# Patient Record
Sex: FEMALE | Race: WHITE
[De-identification: names, ages, dates, MRNs, and addresses within clinical notes are randomized per-mention and may not be internally consistent; named-entity substitution may affect disease eponyms.]

---

## 2021-03-18 PROBLEM — D64.9 ANEMIA: Status: ACTIVE | Noted: 2021-03-18

## 2021-03-18 PROBLEM — E28.2 PCOS (POLYCYSTIC OVARIAN SYNDROME): Status: ACTIVE | Noted: 2021-03-18

## 2021-03-18 PROBLEM — F31.9 BIPOLAR 1 DISORDER (HCC): Status: ACTIVE | Noted: 2021-03-18

## 2021-03-18 PROBLEM — Z72.0 TOBACCO USE: Status: ACTIVE | Noted: 2021-03-18

## 2021-05-05 PROBLEM — N84.0 ENDOMETRIAL POLYP: Status: ACTIVE | Noted: 2021-05-05

## 2022-05-12 ENCOUNTER — NURSE TRIAGE (OUTPATIENT)
Dept: OTHER | Facility: CLINIC | Age: 32
End: 2022-05-12

## 2022-05-12 NOTE — TELEPHONE ENCOUNTER
Subjective: Caller states \"neck pain\"     Current Symptoms:   Rt Neck pain - denies injury   Limited mobility   Tingling in fingers started today     Onset:  3-4 days after waking up     Associated Symptoms: na     Pain Severity: 7/10    Temperature:  None     What has been tried: position changes + ibuprofen + massage + heat    LMP: na Pregnant: no    Recommended disposition: See PCP within 24 Hours    Care advice provided, patient verbalizes understanding; denies any other questions or concerns; instructed to call back for any new or worsening symptoms. Agrees to f/u with hcp    This triage is a result of a call to [address] [place] [address]. Please do not respond to the triage nurse through this encounter. Any subsequent communication should be directly with the patient.       Reason for Disposition   Numbness in an arm or hand (i.e., loss of sensation)    Protocols used: NECK PAIN OR STIFFNESS-ADULT-

## 2023-01-23 ENCOUNTER — NURSE TRIAGE (OUTPATIENT)
Dept: OTHER | Facility: CLINIC | Age: 33
End: 2023-01-23

## 2023-01-24 NOTE — TELEPHONE ENCOUNTER
Location of patient: [place]    Subjective: Caller states tender, sore painful knot on back of neck, 2 now, 5-6 ays ago, was a natalie sized, now  2-3 inches across, 1 inch wide, oval sized. Dimes sized, minimal redness. Current Symptoms: painful swelling/lump on neck    Onset: 6 days ago; gradual    Associated Symptoms: NA    Pain Severity: 7/10; aching; constant    Temperature: 98.5 by unknown method    What has been tried: aleve    LMP: NA Pregnant: NA    Recommended disposition: See PCP within 24 Hours    Care advice provided, patient verbalizes understanding; denies any other questions or concerns; instructed to call back for any new or worsening symptoms. Patient/caller agrees to follow-up with PCP  or [facility] if PCP unavailable    This triage is a result of a call to [address] [place] [address]. Please do not respond to the triage nurse through this encounter. Any subsequent communication should be directly with the patient.       Reason for Disposition   [1] Swelling is painful to touch AND [2] no fever    Protocols used: Skin Lump or Localized Swelling-ADULT-

## 2023-01-26 ENCOUNTER — NURSE TRIAGE (OUTPATIENT)
Dept: OTHER | Age: 33
End: 2023-01-26

## 2023-01-26 NOTE — TELEPHONE ENCOUNTER
Location of patient: [place]    Subjective: Caller states \"About 9 days ago the back of my neck started swelling. No injury occurred. Just woke up and it was there. Used warm and cold compresses. Another knot popped up. Both of them have gotten bigger and started to spread. Went to urgent care within 24 hours. Gave me prednisone and scheduled me for ultrasound this Friday. My left hand has started to become numb and lasted 1 day. Intermittently is going numb and tingly. 45 minutes ago I started feeling sick. \"     Current Symptoms: knots developing on neck, nausea, left hand tingling/numbness, sharp pain in spine    Onset: 9 days ago     Associated Symptoms: NA    Pain Severity: 6/10; numbness, tingling; intermittent    Temperature: denies fever     What has been tried: prednisone, warm and cold compresses     LMP:  unknown  Pregnant: Unknown    Recommended disposition: Go to ED Now    Care advice provided, patient verbalizes understanding; denies any other questions or concerns; instructed to call back for any new or worsening symptoms. Patient/caller agrees to proceed to local Emergency Department    This triage is a result of a call to [address] [place] [address]. Please do not respond to the triage nurse through this encounter. Any subsequent communication should be directly with the patient.     Reason for Disposition   Weakness of an arm or hand    Protocols used: Neck Pain or Stiffness-ADULT-AH

## 2023-02-02 ENCOUNTER — NURSE TRIAGE (OUTPATIENT)
Dept: OTHER | Facility: CLINIC | Age: 33
End: 2023-02-02

## 2023-02-19 ENCOUNTER — NURSE TRIAGE (OUTPATIENT)
Dept: OTHER | Facility: CLINIC | Age: 33
End: 2023-02-19

## 2023-02-20 NOTE — TELEPHONE ENCOUNTER
Location of patient: [place]    Subjective: Caller states \" That she has two cyst on her neck. Since the 14th and her canceled surgery. She states that they are puprle in color. She states that her surgery is cancelled due to her blood sugars. She states that the is in severe pain. The change in color is new, with bruising. States that the cysts has grown and it is purple. Patient states that she has completed her course of antibiotics; and the cysts are located at the baseline of her neck. \"     Current Symptoms: pain     Onset: 3 days ago; worsening    Associated Symptoms:  swelling    Pain Severity: 8/10; sharp, burning; excruciating    Temperature: 99.1 by forehead thermometer    What has been tried: tylenol      Recommended disposition: Go to ED Now    Care advice provided, patient verbalizes understanding; denies any other questions or concerns; instructed to call back for any new or worsening symptoms. Patient/caller agrees to proceed to nearest Emergency Department    This triage is a result of a call to [address] [place] [address]. Please do not respond to the triage nurse through this encounter. Any subsequent communication should be directly with the patient.     Reason for Disposition   Black (necrotic) color or blisters develop in wound    Protocols used: Boil (Skin Abscess)-ADULT-

## 2023-02-26 ENCOUNTER — NURSE TRIAGE (OUTPATIENT)
Dept: OTHER | Facility: CLINIC | Age: 33
End: 2023-02-26

## 2023-02-26 NOTE — TELEPHONE ENCOUNTER
Location of patient: [place]    Subjective: Caller states \"cysts on my neck. I was scheduled for surgery and now I am diabetic as well. My sugar was high so they sent me home. She squeezed my neck to check it. My neck started to really swell now and it is purple. Fever a few days ago, I called you and you advised me to go to ED. I decided not to go. I called my surgeon to get some antibiotics. Now today, it is affecting opening my mouth. I am doing warm packs, ice packs. I have blisters on my neck now. Last night I jumped in a hot tub. I woke up today and my cyst has drained a lot. I am concerned, should I be worried about that? My cyst drained externally should I be worried. Patient is on antibiotics. I have heat blisters on the back of neck. \"     Current Symptoms: blisters and draining cyst on neck. Had a fever, pt is diabetic as well. Onset: 6 weeks ago; sudden    Associated Symptoms: NA    Pain Severity: 6/10; pressure; constant    Temperature: 97.9 orally    What has been tried: warm compress, cold compress. LMP:  IUD  Pregnant: No    Recommended disposition: Go to ED/[facility] Now (Or to Office with PCP Approval)    Care advice provided, patient verbalizes understanding; denies any other questions or concerns; instructed to call back for any new or worsening symptoms. Patient/caller agrees to proceed to [facility] Emergency Department    This triage is a result of a call to [address] [place] [address] of [employer]. Please do not respond to the triage nurse through this encounter. Any subsequent communication should be directly with the patient.           Reason for Disposition   Black (necrotic) color or blisters develop in wound    Protocols used: Boil (Skin Abscess)-ADULT-

## 2023-08-24 ENCOUNTER — NURSE TRIAGE (OUTPATIENT)
Dept: OTHER | Facility: CLINIC | Age: 33
End: 2023-08-24

## 2023-08-24 NOTE — TELEPHONE ENCOUNTER
Location of patient: [place]    Subjective: Caller states she took 14 extra units of Humalog ten minutes ago by accident. Blood sugar taken while on the phone and the reading is 423. Denies having any abnormal symptoms. Current Symptoms: OD of insulin    Onset: 10 minutes ago    Associated Symptoms: Patient denies symptoms    Pain Severity: Denies pain    Temperature: Denies fever     What has been tried: nothing    Recommended disposition:  Call PCP now. This writer connected to PCP answering service. Care advice provided, patient verbalizes understanding; denies any other questions or concerns; instructed to call back for any new or worsening symptoms. This triage is a result of a call to [address] [place] [address]. Please do not respond to the triage nurse through this encounter. Any subsequent communication should be directly with the patient.     Reason for Disposition   Diabetes drug error or overdose (e.g., took wrong type of insulin or took extra dose)    Protocols used: Medication Question Call-ADULT-

## 2023-09-18 ENCOUNTER — NURSE TRIAGE (OUTPATIENT)
Dept: OTHER | Facility: CLINIC | Age: 33
End: 2023-09-18

## 2023-09-18 NOTE — TELEPHONE ENCOUNTER
Location of patient: [place]    Subjective: Caller states \"abscess\"     Current Symptoms: History abscesses on neck treated and almost healed. Patient reporting vulvar abscess lager than golf ball size. Abscess draining white and black draining. Patient took 4 extra strength Tylenol 4.5 hours ago. Nausea for 3 days. Onset: 3 days ago;     Pain Severity: 7/10; Temperature: unknown, patient has not taken temperature    What has been tried: warm compress, Tylenol    LMP:  n/a IUD  Pregnant: No    Recommended disposition: See PCP within 24 Hours. Call [facility] Now    Care advice provided, patient verbalizes understanding; denies any other questions or concerns; instructed to call back for any new or worsening symptoms. Patient/caller agrees to follow-up with PCP  Patient provided contact for [facility] and agrees to call now. This triage is a result of a call to [address] [place] [address]. Please do not respond to the triage nurse through this encounter. Any subsequent communication should be directly with the patient.     Reason for Disposition   Painful lump or swelling at opening to vagina (on labia)   Genital area looks infected (e.g., draining sore, spreading redness)   Unexplained nausea   [1] DOUBLE DOSE (an extra dose or lesser amount) of over-the-counter (OTC) drug AND [2] any symptoms (e.g., dizziness, nausea, pain, sleepiness)    Protocols used: Boil (Skin Abscess)-ADULT-, Vulvar Symptoms-ADULT-, Medication Question Call-ADULT-, Nausea-ADULT-AH

## 2025-06-02 ENCOUNTER — OFFICE VISIT (OUTPATIENT)
Facility: CLINIC | Age: 35
End: 2025-06-02
Payer: COMMERCIAL

## 2025-06-02 VITALS
DIASTOLIC BLOOD PRESSURE: 87 MMHG | HEART RATE: 91 BPM | RESPIRATION RATE: 16 BRPM | BODY MASS INDEX: 52.52 KG/M2 | WEIGHT: 293 LBS | SYSTOLIC BLOOD PRESSURE: 110 MMHG | OXYGEN SATURATION: 96 % | TEMPERATURE: 99.2 F

## 2025-06-02 DIAGNOSIS — B34.9 VIRAL ILLNESS: Primary | ICD-10-CM

## 2025-06-02 LAB
POC RAPID INFLUENZA A: NEGATIVE
POC SARS-COV-2 AG BINAX: NORMAL

## 2025-06-02 RX ORDER — INSULIN LISPRO 100 [IU]/ML
11 INJECTION, SOLUTION INTRAVENOUS; SUBCUTANEOUS
COMMUNITY
Start: 2025-01-16

## 2025-06-02 RX ORDER — TRIAMCINOLONE ACETONIDE 1 MG/G
OINTMENT TOPICAL
COMMUNITY
Start: 2025-04-03

## 2025-06-02 RX ORDER — INSULIN GLARGINE 100 [IU]/ML
36 INJECTION, SOLUTION SUBCUTANEOUS NIGHTLY
COMMUNITY
Start: 2025-01-16

## 2025-06-02 RX ORDER — TACROLIMUS 1 MG/G
OINTMENT TOPICAL 2 TIMES DAILY
COMMUNITY
Start: 2025-04-03 | End: 2026-04-03

## 2025-06-02 RX ORDER — DICYCLOMINE HYDROCHLORIDE 20 MG/1
1 TABLET ORAL
COMMUNITY
Start: 2025-03-21

## 2025-06-02 RX ORDER — ALPRAZOLAM 0.5 MG/1
0.5 TABLET, ORALLY DISINTEGRATING ORAL
COMMUNITY
Start: 2025-06-02

## 2025-06-02 RX ORDER — FLUOXETINE HYDROCHLORIDE 40 MG/1
40 CAPSULE ORAL
COMMUNITY

## 2025-06-02 RX ORDER — ALBUTEROL SULFATE 90 UG/1
2 INHALANT RESPIRATORY (INHALATION) EVERY 4 HOURS PRN
COMMUNITY
Start: 2024-06-13

## 2025-06-02 RX ORDER — CEPHALEXIN 500 MG/1
1 CAPSULE ORAL
COMMUNITY
Start: 2025-04-02

## 2025-06-02 RX ORDER — VIT C/E/ZN/COPPR/LUTEIN/ZEAXAN 250MG-90MG
5000 CAPSULE ORAL
COMMUNITY
Start: 2025-04-18

## 2025-06-02 RX ORDER — METFORMIN HYDROCHLORIDE 500 MG/1
500 TABLET ORAL 2 TIMES DAILY
COMMUNITY
Start: 2022-12-02

## 2025-06-02 RX ORDER — LAMOTRIGINE 200 MG/1
200 TABLET ORAL NIGHTLY
COMMUNITY

## 2025-06-02 RX ORDER — ACETAZOLAMIDE 500 MG/1
500 CAPSULE, EXTENDED RELEASE ORAL 2 TIMES DAILY
COMMUNITY

## 2025-06-02 RX ORDER — GABAPENTIN 100 MG/1
100 CAPSULE ORAL 3 TIMES DAILY
COMMUNITY
Start: 2025-01-16 | End: 2026-01-16

## 2025-06-02 NOTE — PROGRESS NOTES
Subjective   History  Lulu Santana is a 34 y.o. female who presents for Nausea, Diarrhea, and Fatigue.    HPI  Diarrhea, nausea and fatigue x 4 days, patient also complains of left lower quadrant abdominal pain, fever, chills, body aches.  Denies any melena, hematochezia, hematemesis.  He is a diabetic.  No history of diverticulitis that she knows of.  No treatment prior to arrival.      Review of Systems   Review of Systems   All other systems reviewed and are negative.      Objective   Vital Signs  /87   Pulse 91   Temp 37.3 °C (99.2 °F)   Resp 16   Wt 139 kg (306 lb)   SpO2 96%   BMI 52.52 kg/m²    All vitals have been reviewed and are stable.     Physical Exam  Physical Exam  Vitals and nursing note reviewed.   Constitutional:       General: She is not in acute distress.     Appearance: Normal appearance. She is not toxic-appearing.   HENT:      Head: Normocephalic.      Right Ear: External ear normal.      Left Ear: External ear normal.      Nose: Nose normal.      Mouth/Throat:      Mouth: Mucous membranes are moist.      Pharynx: No oropharyngeal exudate or posterior oropharyngeal erythema.   Eyes:      Extraocular Movements: Extraocular movements intact.   Cardiovascular:      Rate and Rhythm: Normal rate and regular rhythm.      Heart sounds: Normal heart sounds.   Pulmonary:      Effort: Pulmonary effort is normal.      Breath sounds: Normal breath sounds. No wheezing.   Abdominal:      General: Bowel sounds are normal.      Tenderness: There is abdominal tenderness in the left lower quadrant.   Musculoskeletal:         General: Normal range of motion.      Cervical back: Normal range of motion and neck supple.   Skin:     Capillary Refill: Capillary refill takes less than 2 seconds.   Neurological:      General: No focal deficit present.      Mental Status: She is alert and oriented to person, place, and time.   Psychiatric:         Mood and Affect: Mood normal.         Behavior: Behavior  normal.         Thought Content: Thought content normal.         Diagnostic Results   No results found for this or any previous visit (from the past 48 hours).    Assessment/Plan   Influenza COVID testing negative in office  At this time this is the extent of when I can complete in the urgent care to direct her to go to the ER for further evaluation of abdominal pain given her comorbidities.  Patient understands and will even go directly to the ER.  Procedures   N/A    Problem List Items Addressed This Visit    None      UC Course  Patient disposition: ED    Red flags for reporting to ER have been reviewed with the patient.    Current diagnosis, any medication changes, and all in-office lab or radiologic results have been reviewed with the patient at the time of the visit.   If symptoms do not improve or worsen, patient is to follow up with PCP or report to the emergency room.   Patient is alert and oriented x3 and non-toxic appearing. Vital signs are stable.   Patient and/or guardian has sufficient decision-making capabilities at this time and reports understanding and agreement with the treatment plan made through shared decision-making.